# Patient Record
Sex: FEMALE | URBAN - METROPOLITAN AREA
[De-identification: names, ages, dates, MRNs, and addresses within clinical notes are randomized per-mention and may not be internally consistent; named-entity substitution may affect disease eponyms.]

---
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
## 2022-12-01 NOTE — TELEPHONE ENCOUNTER
REFERRAL INFORMATION:    Referring Provider:  Self Referral     Referring Clinic:  N/A    Reason for Visit/Diagnosis: New MWM, BMI 39       FUTURE VISIT INFORMATION:    Appointment Date: 12/7/2022    Appointment Time: 8 AM     NOTES RECORD STATUS  DETAILS   OFFICE NOTE from Referring Provider N/A    OFFICE NOTE from Other Specialists N/A    HOSPITAL DISCHARGE SUMMARY/ ED VISITS  N/A    OPERATIVE REPORT N/A    ENDOSCOPY (EGD)  N/A    PERTINENT LABS Care Everywhere    PATHOLOGY REPORTS (RELATED) N/A    IMAGING (CT, MRI, US, XR)  N/A

## 2022-12-06 ENCOUNTER — CARE COORDINATION (OUTPATIENT)
Dept: ENDOCRINOLOGY | Facility: CLINIC | Age: 22
End: 2022-12-06

## 2022-12-06 NOTE — PROGRESS NOTES
Called patient to remind her of her video visit tomorrow with Jessica Infante at 8:00 am. I reminded her to do her questionnaire as well. She acknowledged this.     Merly Jimenez, EMT

## 2022-12-07 ENCOUNTER — VIRTUAL VISIT (OUTPATIENT)
Dept: ENDOCRINOLOGY | Facility: CLINIC | Age: 22
End: 2022-12-07
Payer: COMMERCIAL

## 2022-12-07 ENCOUNTER — PRE VISIT (OUTPATIENT)
Dept: ENDOCRINOLOGY | Facility: CLINIC | Age: 22
End: 2022-12-07

## 2022-12-07 VITALS — HEIGHT: 61 IN | WEIGHT: 205 LBS | BODY MASS INDEX: 38.71 KG/M2

## 2022-12-07 DIAGNOSIS — E66.812 CLASS 2 SEVERE OBESITY WITH SERIOUS COMORBIDITY AND BODY MASS INDEX (BMI) OF 38.0 TO 38.9 IN ADULT, UNSPECIFIED OBESITY TYPE (H): ICD-10-CM

## 2022-12-07 DIAGNOSIS — E66.01 CLASS 2 SEVERE OBESITY WITH SERIOUS COMORBIDITY AND BODY MASS INDEX (BMI) OF 38.0 TO 38.9 IN ADULT, UNSPECIFIED OBESITY TYPE (H): Primary | ICD-10-CM

## 2022-12-07 DIAGNOSIS — E66.812 CLASS 2 SEVERE OBESITY WITH SERIOUS COMORBIDITY AND BODY MASS INDEX (BMI) OF 38.0 TO 38.9 IN ADULT, UNSPECIFIED OBESITY TYPE (H): Primary | ICD-10-CM

## 2022-12-07 DIAGNOSIS — E66.01 CLASS 2 SEVERE OBESITY WITH SERIOUS COMORBIDITY AND BODY MASS INDEX (BMI) OF 38.0 TO 38.9 IN ADULT, UNSPECIFIED OBESITY TYPE (H): ICD-10-CM

## 2022-12-07 DIAGNOSIS — Z71.3 NUTRITIONAL COUNSELING: Primary | ICD-10-CM

## 2022-12-07 PROBLEM — F60.89 CLUSTER B PERSONALITY DISORDER (H): Status: ACTIVE | Noted: 2020-09-24

## 2022-12-07 PROBLEM — F43.21 ADJUSTMENT DISORDER WITH DEPRESSED MOOD: Status: ACTIVE | Noted: 2017-04-13

## 2022-12-07 PROBLEM — T74.22XA CHILD SEXUAL ABUSE: Status: ACTIVE | Noted: 2019-09-11

## 2022-12-07 PROBLEM — F79 INTELLECTUAL DISABILITY: Status: ACTIVE | Noted: 2018-01-25

## 2022-12-07 PROBLEM — F33.1 MODERATE EPISODE OF RECURRENT MAJOR DEPRESSIVE DISORDER (H): Status: ACTIVE | Noted: 2019-09-09

## 2022-12-07 PROBLEM — T50.902A INTENTIONAL DRUG OVERDOSE (H): Status: ACTIVE | Noted: 2018-11-12

## 2022-12-07 PROCEDURE — 97802 MEDICAL NUTRITION INDIV IN: CPT | Mod: GT | Performed by: DIETITIAN, REGISTERED

## 2022-12-07 PROCEDURE — 99204 OFFICE O/P NEW MOD 45 MIN: CPT | Mod: GT | Performed by: NURSE PRACTITIONER

## 2022-12-07 RX ORDER — HYDROXYZINE HYDROCHLORIDE 25 MG/1
25-50 TABLET, FILM COATED ORAL
COMMUNITY
End: 2022-12-07

## 2022-12-07 RX ORDER — CITALOPRAM HYDROBROMIDE 40 MG/1
40 TABLET ORAL
COMMUNITY
End: 2022-12-07

## 2022-12-07 RX ORDER — ACETAMINOPHEN AND CODEINE PHOSPHATE 120; 12 MG/5ML; MG/5ML
0.35 SOLUTION ORAL
COMMUNITY
End: 2022-12-07

## 2022-12-07 RX ORDER — PHENOL 1.4 %
5 AEROSOL, SPRAY (ML) MUCOUS MEMBRANE
COMMUNITY
Start: 2022-07-22 | End: 2023-02-24

## 2022-12-07 RX ORDER — FERROUS SULFATE 325(65) MG
325 TABLET ORAL
COMMUNITY
End: 2022-12-07

## 2022-12-07 RX ORDER — METFORMIN HCL 500 MG
TABLET, EXTENDED RELEASE 24 HR ORAL
Qty: 60 TABLET | Refills: 3 | Status: SHIPPED | OUTPATIENT
Start: 2022-12-07 | End: 2023-02-24

## 2022-12-07 ASSESSMENT — PAIN SCALES - GENERAL: PAINLEVEL: MODERATE PAIN (5)
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
## 2022-12-07 NOTE — LETTER
"12/7/2022       RE: Keshia Keating  [address] [place] [address]     Dear Colleague,    Thank you for referring your patient, Keshia Keating, to the [facility] [place] [facility]. Please see a copy of my visit note below.    Keshia Keating is a 22 year old female who is being evaluated via a billable video visit.      The patient has been notified of following:     \"This video visit will be conducted via a call between you and your physician/provider. We have found that certain health care needs can be provided without the need for an in-person physical exam.  This service lets us provide the care you need with a video conversation.  If a prescription is necessary we can send it directly to your pharmacy.  If lab work is needed we can place an order for that and you can then stop by our lab to have the test done at a later time.    Video visits are billed at different rates depending on your insurance coverage.  Please reach out to your insurance provider with any questions.    If during the course of the call the physician/provider feels a video visit is not appropriate, you will not be charged for this service.\"    Patient has given verbal consent for Video visit? Yes  How would you like to obtain your AVS? MyChart  If you are dropped from the video visit, the video invite should be resent to: Text to cell phone: [phone]  Will anyone else be joining your video visit? No  {If patient encounters technical issues they should call [phone]      Video-Visit Details    Type of service:  Video Visit    Video Start Time: 9:41 am  Video End Time: 10:08 am    Originating Location (pt. Location): Home    Distant Location (provider location): [facility] [place]     Platform used for Video Visit: Anave[employer]    During this virtual visit the patient is located in [place], patient verifies this " "as the location during the entirety of this visit.     New Weight Management Nutrition Consultation    Keshia Keating is a 22 year old female presents today for new weight management nutrition consultation.      Patient referred by Jessica Infante on December 7, 2022.    Patient with Co-morbidities of obesity including:  Type II DM no  Renal Failure no  Sleep apnea no  Hypertension no   Dyslipidemia no  Joint pain yes hip per pt  Back pain yes  GERD no     PMH:    Irregular menses   Asthma     Depression  Hx of suicide attempt July 2022 per NP    Just recently started meeting with her therapist for first time in a couple months    Anthropometrics:  Weight 12/7/22: 205 lbs with BMI 38.73    Estimated body mass index is 38.73 kg/m  as calculated from the following:    Height as of an earlier encounter on 12/7/22: 1.549 m (5' 1\").    Weight as of an earlier encounter on 12/7/22: 93 kg (205 lb).    Medications for Weight Loss:  Metformin prescribed 12/7/22    Supplements:  Collagen  Vit E  - Tries to take but forgets     NUTRITION HISTORY    Lactose intolerance - avoids milk aside from skim    Has never worked with a RD before. Did have nutrition education previously for constipation.    Typically eats 2 meals/day and snacks.  Feels snacking is more so because of boredom.    Tries to follow plate method but doesn't always go well per pt.     Loves water and could drink all the time but forgets.   Trying to decrease juice and pop intake.   Likes to make her own juice - has a juicer but hasn't been using.    Barriers: mental health (more likely to eat if depressed), thoughts around food (use to want to vomit anytime she ate), negative self talk, eats diff than her boyfriend and she gets tempted by foods he eats    Recently broke up with boyfriend but now they are back together    Hx of purging after meals. Stopped after having kids    Additional information:   Recent sore throw has impacted intake. Harder to eat/drink. " Recommended patient follow up with doctor sooner than later.    3 kids under age 5     Nutrition Prescription  Recommended energy/nutrient modification.    Nutrition Diagnosis  Obesity r/t long history of positive energy balance aeb BMI >30.    Nutrition Intervention  Reviewed current dietary habits and pts history   Discussed long-term goals pt hopes to accomplish in RD appointments  Answered pt questions  Coordination of care   Nutrition education   AVS and handouts via [employer]     Patient demonstrates understanding.    Expected Engagement: good    Nutrition Goals  1) Moderation with pop/juice. Aim to replace with whole fruit  2) Change up type of fats when cooking - try olive oil instead    Additional resources:    The Plate Method  [email or web address]    Protein Sources   [email or web address]     Carbohydrates  [email or web address]     Mindful Eating  [email or web address]     Summary of Volumetrics Eating Plan  [email or web address]     Follow-Up:  1 month    Time spent with patient: 27 minutes.  WILLA Triplett, RD, LD            Again, thank you for allowing me to participate in the care of your patient.      Sincerely,    Abigail White RD

## 2022-12-07 NOTE — ASSESSMENT & PLAN NOTE
Adult onset obesity, after pregnancies. Has always struggled with body image but doesn't think she was actually heavy until second pregnancy. Significant family history of DMII and is concerned about developing this more recently.  No recent A1C or labs but reports signs of acanthosis migrans and frequency of urination over night. Would like to work on weight management and prevent further complications.     Mental health has been difficult recently, SI by overdose less than 6 months ago. Started with therapist last week and works with psychiatry regularly. Not currently on any medications. Mental health feels its improving. Was previously on bupropion and naltrexone but no perceived benefit to cravings/ hunger. Over dose with bupropion.     Will start with metformin, could consider GLP1 in the future (patient hesitant about injections). Will get baseline labs. Work with dietitian and continue establishing with therapist.     Recent sore throat - starting after acute URI 3 weeks ago, continues to struggle with swallowing. Has appt scheduled at primary care clinic in January for evaluation. Recommend evaluation sooner.     Plan:  -start metformin  -get labs done- will fax to [facility]   -work with dietitian   -get seen for sore throat sooner   -follow up 2 months

## 2022-12-07 NOTE — PATIENT INSTRUCTIONS
"Thank you for allowing us the privilege of caring for you. We hope we provided you with the excellent service you deserve.   Please let us know if there is anything else we can do for you so that we can be sure you are completely satisfied with your care experience.    To ensure the quality of our services you may be receiving a patient satisfaction survey from an independent patient satisfaction monitoring company.    The greatest compliment you can give is a \"Likely to Recommend\"    Your visit was with Jessica Infante NP today.    Instructions per today's visit:     Ray Keating, it was great to visit with you today.  Here is a review of our visit.  If our clinic scheduler is not able to reach you please call [phone] to schedule your next appointments.    -start metformin  -get labs done- will fax to [facility] clinic   -work with dietitian   -get seen for sore throat sooner   -follow up 2 months       Information about Video Visits with [employer]: video visit information  _________________________________________________________________________________________________________________________________________________________  If you are asked by your clinic team to have your blood pressure checked:  [facility] do offer several locations for blood pressure checks. Please follow the below link to schedule an appointment. Scheduling an appointment at the pharmacy for a blood pressure check is now preferred.    Appointment Plus (appointment-plus.Promineo studios)  _________________________________________________________________________________________________________________________________________________________  Important contact and scheduling information:  Please call our contact center at [phone] to schedule your next appointments.  To find a lab location near you, please call [phone].  For any nursing questions or concerns call Reina Macdonald LPN at [phone] or Carmelita Pérez RN at " [phone]  Please call during clinic hours Monday through Friday 8:00a - 4:00p if you have questions or you can contact us via [email or web address] at anytime and we will reply during clinic hours.    Lab results will be communicated through My Chart or letter (if My Chart not used). Please call the clinic if you have not received communication after 1 week or if you have any questions.?  Clinic Fax: [phone]    _________________________________________________________________________________________________________________________________________________________  Meal Replacement Products:    Here is the link to our new e-store where you can purchase our meal replacement products    [facility] E-Store  Ecociclus/store    The one week starter kit is a great way to sample a variety of products and see what works for you.    If you want more information about the product go to: [email or web address]    If you are an employee or [facility] or [place] please contact your care team for a 10% estore discount    Free Shipping for orders over $75     Benefits of meal replacements products:    Portion and calorie control  Improved nutrition  Structured eating  Simplified food choices  Avoid contact with trigger foods  _________________________________________________________________________________________________________________________________________________________  Interested in working with a health ?  Health coaches work with you to improve your overall health and wellbeing.  They look at the whole person, and may involve discussion of different areas of life, including, but not limited to the four pillars of health (sleep, exercise, nutrition, and stress management). Discuss with your care team if you would like to start working a health .  Health Coaching-3 Pack: Schedule by calling [phone]    $99 for three health coaching visits    Visits may  be done in person or via phone    Coaching is a partnership between the  and the client; Coaches do not prescribe or diagnose    Coaching helps inspire the client to reach his/her personal goals   _________________________________________________________________________________________________________________________________________________________  24 Week Healthy Lifestyle Plan:    Our mission in the 24-week Healthy Lifestyle Plan is to provide you with individualized care by giving you the tools, education and support you need to lose weight and maintain a healthy lifestyle. In your 24-week journey, you ll be supported by a dedicated weight loss team that includes registered dietitians, medical weight management providers, health coaches, and nurses -- all with special expertise in weight loss -- to help you every step of the way.     Monthly meetings with your registered dietician or medical weight management provider help to review your progress, update your care plan, and make any adjustments needed to ensure success. Between these visits, weekly and bi-weekly health  visits will help you focus on the four pillars of weight loss -- stress, sleep, nutrition, and exercise -- and how you can best adapt each to achieve sustainable weight loss results.    In addition, you will be given exclusive access to online wellbeing classes through [employer].  Your initial visit will be with a medical weight management provider who will help to understand your weight loss goals and ensure this program is the right fit for you. Please let our team know if you are interested in the 24 week plan by sending a message to your care team or calling [phone] to schedule.  _________________________________________________________________________________________________________________________________________________________    COMPREHENSIVE WEIGHT MANAGEMENT PROGRAM  VIRTUAL SUPPORT GROUPS    For Support Group  "Information:      We offer support groups for patients who are working on weight loss and considering, preparing for or have had weight loss surgery.   There is no cost for this opportunity.  You are invited to attend the?Virtual Support Groups?provided by any of the following locations:    [facility] via Microsoft Teams with Dorita Villareal RN  2.   [place] via [employer] with Brandon Wong, PhD, LP  3.   [place] via [employer] with Corinne Jerez RN  4.   [facility] via Macrotek Teams with Corinne Diaz [facility]    The following Support Group information can also be found on our website:  [email or web address]    [facility] Weight Loss Surgery Support Group    [facility] Weight Loss Surgery Support Group  The support group is a patient-lead forum that meets monthly to share experiences, encouragement and education. It is open to those who have had weight loss surgery, are scheduled for surgery, and those who are considering surgery.   WHEN: This group meets on the 3rd Wednesday of each month from 5:00PM - 6:00PM virtually using Microsoft Teams.   FACILITATOR: Led by Dorita Villareal RD, LD, RN, the program's Clinical Coordinator.   TO REGISTER: Please contact the clinic via Syntervention or call the nurse line directly at [phone] to inform our staff that you would like an invite sent to you and to let us know the email you would like the invite sent to. Prior to the meeting, a link with directions on how to join the meeting will be sent to you.    2022 Meetings  Billie 15: \"Let's Talk\" a time for the group to share.  July 20: \"Let's Talk\" a time for the group to share.  August 17: \"Let's Talk\" a time for the group to share.  September 21: \"Let's Talk\" a time for the group to share.  October 19: Guest Speaker: Dr Misha Rios MD Pulmonologist and Sleep Medicine Physician, \"Getting a Good Night's Sleep\".  November 16: \"Let's " "Talk\" a time for the group to share.  December 21: \"Let's Talk\" a time for the group to share.    [facility] Support Groups    Connections: Bariatric Care Support Group?  This is open to all [facility] (and those external to this program) pre- and post- operative bariatric surgery patients as well as their support system.   WHEN: This group meets the 2nd Tuesday of each month from 6:30 PM - 8:00 PM virtually using Microsoft Teams.   FACILITATOR: Led by Brandon Wong, Ph.D who is a Licensed Psychologist with the [facility] Comprehensive Weight Management Program.   TO REGISTER: Please send an email to Brandon Wong, Ph.D., LP at?[email or web address]?if you would like an invitation to the group and to learn about using Microsoft Teams.    2022 Meetings  June 14: Melissa Hanley RD, LD at [facility], \"Nutritional Labeling\"  July 12 August 2 (Please Note Date Change)  September 13 October 11 November 8 December 13    Connections: Post-Operative Bariatric Surgery Support Group  This is a support group for [facility] bariatric patients (and those external to [facility]) who have had bariatric surgery and are at least 3 months post-surgery.  WHEN: This support group meets the 4th Wednesday of the month from 11:00 AM - 12:00 PM virtually using Microsoft Teams.   FACILITATOR: Led by Certified Bariatric Nurse, Corinne Jerez RN.   TO REGISTER: Please send an email to Corinne at [email or web address] if you would like an invitation to the group and to learn about using Microsoft Teams.    2022 Meetings June 22 July 27 August 24 September 28 October 26 November 23 December 28      [facility] Healthy Lifestyle Virtual Support Group    Healthy Lifestyle Virtual Support Group?  This is 60 minutes of small group guided discussion, support and resources. All are welcome who want a healthy " "lifestyle.  WHEN: This group meets monthly on a Friday from 12:30 PM - 1:30 PM virtually using Microsoft Teams.   FACILITATOR: Led by National Board Certified Health and , Corinne Diaz [facility].   TO REGISTER: Please send an email to Corinne [email or web address] to receive monthly invites to the group or if you have any questions about having a health .  Prior to the meeting, a link with directions on how to join the meeting will be sent to you.    2022 Meetings  June 24: Corinne Diaz [facility], \"Setting Limits and Boundaries\".  Jul 29: Open Forum  August 26: Guest Speaker: Elle Corbett Registered Dietitian  September 30: Open Forum  October 28th: Guest Speaker: Stacy Johnson [facility], Health , \"Gratitude Practices\".  November 18: Guest Speaker: Abigail White RD Registered Dietitian, \"Navigating How to Eat around the Holidays\".  December 16: Guest Speaker: Shefali Mcleod [facility], \"Changing Your Relationship with Movement\".    ____________________________________________________________________________________________________________________________________________________________________________  [facility] Get Moving Program  Our team of physical therapists is trained to help you understand and take control of your condition. They will perform a thorough evaluation to determine your ability for activity and develop a customized plan to fit your goals and physical ability.  Scheduling: Unsure if the Get Moving program is right for you? Discuss the program with your medical provider or diabetes educator. You can also call us at [phone] to ask questions or schedule an appointment.   [facility] Get Moving Program  ____________________________________________________________________________________________________________________________________________________________________________  [facility] Diabetes Prevention Program (DPP)  If you have prediabetes and Medicare " please contact us via [employer] to learn more about the Diabetes Prevention Program (DPP)  Program Details:  [facility] offers the year-long Diabetes Prevention Program (DPP). The program helps you to make lifestyle changes that prevent or delay type 2 diabetes by supporting healthy eating, increased physical activity, stress reduction and use of coping skills.   On average, previous [facility] DPP cohorts have lost and maintained at least 5% of their starting weight throughout the program and averaged more than 150 minutes of physical activity per week.  Participants meet weekly for one-hour group sessions over sixteen weeks, every other week for the next 8 weeks, and monthly for the last six months.   A year-long maintenance program is also available for participants who complete the first year.   Location & Cost:   During the COVID-19 Public Health Emergency, the program is offered virtually. When in-person classes can resume, they will be held at [facility].  For people with Medicare, the program is covered in full. A self-pay option will also be available for those with non-Medicare insurance plans.   _________________________________________________________________________________________________________________________________________________________  Bluetooth Scale:    We hope to provide you with high quality virtual healthcare visits while social distancing for COVID-19 is necessary, as well as in the future when virtual visits may be more convenient for you.     Our technology team made it possible for Bluetooth scales to send weight measurements to our electronic medical record. This allows weights from you weighing at home to securely flow into the medical record, which will improve telephone and virtual visits.   Additionally, studies have shown that adults actually lose more weight when their weights are automatically sent to someone else, and also that  this process is not stressful for those adults.    Below is a link for purchasing the scale, with a discount code for our patients. You may call your insurance company to see if they will reimburse you for the cost of the scale, as a piece of durable medical equipment. The scales only go up to a weight of 400 pounds. This is an issue and we are working with the developer on increasing this. We found no scales that go over 400lb that have blue-tooth for connecting to [employer].    Scale to purchase: the Wattblock  Body  Scale: [email or web address]/[email or web address]=aw.ds    Discount Code: We have a discount code for our patients to bring the cost down to $50, Discount code is: [place]_Scale_20%off  _______________________________________________________________________________________________________________________________________________________________________________    To work with a Behavioral Health Psychologist:    Call to schedule:    Natalio Taveras - [phone]  Ulices Weir - [phone]  Yue Hamilton - [phone]  Tri Carrillo - [phone]   Milagro Navarrete PhD (cannot accept Medicare) [phone]        Thank you,   [facility] Comprehensive Weight Management Team    MEDICATION STARTED AT THIS APPOINTMENT    We are starting metformin.  Starting instructions:    Immediate release tablet: Take 1 tablet by mouth daily with a meal for 1 week, then increase to 1 tablet twice daily with meals.   Extended release tablet: Take 1 tablet by mouth daily with a meal for 1 week, then increase to 2 tablets daily with a meal or 1 tablet twice daily with meals.      Contact the nurse via [employer] or call [phone] if you have any questions or concerns    Metformin is a medication that is used to assist with lowering blood sugars in patients that have Pre-Diabetes or Diabetes. It has also been found to help with weight  loss.    Metformin helps to lower blood sugars by lowering the amount of sugar (glucose) your liver produces that would otherwise cause your blood sugar to increase. It also makes your body respond better to insulin (the product that lowers your blood sugar). It is unclear how metformin assists with weight loss.     Side-effects. Metformin is generally well tolerated. The main side-effects we see are diarrhea, nausea and stomach upset - this tends to subside over time as your body gets used to the medication. Taking this medications with food will lower these side effects.    Low blood sugar (hypoglycemia) is rare to occur with metformin, but can occur if you do not eat enough or are taking other medications that assist to lower blood sugar. The signs of low blood sugar are:  Weakness  Shaky   Hungry  Sweating  Confusion    See below for ways to treat low blood sugar without adding in lots of extra calories.    Treating Low Blood Sugar    If you have symptoms of low blood sugar (sweating, shaking, dizzy, confused) eat 15 grams of carbs and wait 15 minutes:    Glucose Tabs are best for sugars under 70 -  Dex4 or BD Glucose tablets are good, you will need to take 3-4 of these to equal 15 grams.     One small box of raisins  4 oz fruit juice box or   cup fruit juice  1 small apple  1 small banana    cup canned fruit in water    English muffin or a slice of bread with jelly   1 low fat frozen waffle with sugar-free syrup    cup cottage cheese with   cup frozen or fresh blueberries  1 cup skim or low-fat milk    cup whole grain cereal  4-6 crackers such as Triscuits    Please refer to the pharmacy insert for more information on side-effects. Please call the clinic should you have more questions regarding this medication.      In order to get refills of this or any medication we prescribe you must be seen in the medical weight mgmt clinic every 2-3 months.

## 2022-12-07 NOTE — PROGRESS NOTES
"Keshia Keating is a 22 year old female who is being evaluated via a billable video visit.      The patient has been notified of following:     \"This video visit will be conducted via a call between you and your physician/provider. We have found that certain health care needs can be provided without the need for an in-person physical exam.  This service lets us provide the care you need with a video conversation.  If a prescription is necessary we can send it directly to your pharmacy.  If lab work is needed we can place an order for that and you can then stop by our lab to have the test done at a later time.    Video visits are billed at different rates depending on your insurance coverage.  Please reach out to your insurance provider with any questions.    If during the course of the call the physician/provider feels a video visit is not appropriate, you will not be charged for this service.\"    Patient has given verbal consent for Video visit? Yes  How would you like to obtain your AVS? MyChart  If you are dropped from the video visit, the video invite should be resent to: Text to cell phone: [phone]  Will anyone else be joining your video visit? No  {If patient encounters technical issues they should call [phone]      Video-Visit Details    Type of service:  Video Visit    Video Start Time: 9:41 am  Video End Time: 10:08 am    Originating Location (pt. Location): Home    Distant Location (provider location): [facility]NAG[facility] [place]     Platform used for Video Visit: XDx    During this virtual visit the patient is located in [place], patient verifies this as the location during the entirety of this visit.     New Weight Management Nutrition Consultation    Keshia Keating is a 22 year old female presents today for new weight management nutrition consultation.      Patient referred by Jessica Infante on December 7, 2022.    Patient with Co-morbidities of obesity including:  Type " "II DM no  Renal Failure no  Sleep apnea no  Hypertension no   Dyslipidemia no  Joint pain yes hip per pt  Back pain yes  GERD no     PMH:    Irregular menses   Asthma     Depression  Hx of suicide attempt July 2022 per NP    Just recently started meeting with her therapist for first time in a couple months    Anthropometrics:  Weight 12/7/22: 205 lbs with BMI 38.73    Estimated body mass index is 38.73 kg/m  as calculated from the following:    Height as of an earlier encounter on 12/7/22: 1.549 m (5' 1\").    Weight as of an earlier encounter on 12/7/22: 93 kg (205 lb).    Medications for Weight Loss:  Metformin prescribed 12/7/22    Supplements:  Collagen  Vit E  - Tries to take but forgets     NUTRITION HISTORY    Lactose intolerance - avoids milk aside from skim    Has never worked with a RD before. Did have nutrition education previously for constipation.    Typically eats 2 meals/day and snacks.  Feels snacking is more so because of boredom.    Tries to follow plate method but doesn't always go well per pt.     Loves water and could drink all the time but forgets.   Trying to decrease juice and pop intake.   Likes to make her own juice - has a juicer but hasn't been using.    Barriers: mental health (more likely to eat if depressed), thoughts around food (use to want to vomit anytime she ate), negative self talk, eats diff than her boyfriend and she gets tempted by foods he eats    Recently broke up with boyfriend but now they are back together    Hx of purging after meals. Stopped after having kids    Additional information:   Recent sore throw has impacted intake. Harder to eat/drink. Recommended patient follow up with doctor sooner than later.    3 kids under age 5     Nutrition Prescription  Recommended energy/nutrient modification.    Nutrition Diagnosis  Obesity r/t long history of positive energy balance aeb BMI >30.    Nutrition Intervention  Reviewed current dietary habits and pts history   Discussed " long-term goals pt hopes to accomplish in RD appointments  Answered pt questions  Coordination of care   Nutrition education   AVS and handouts via Fangxinmeihart     Patient demonstrates understanding.    Expected Engagement: good    Nutrition Goals  1) Moderation with pop/juice. Aim to replace with whole fruit  2) Change up type of fats when cooking - try olive oil instead    Additional resources:    The Plate Method  [email or web address]    Protein Sources   [email or web address]     Carbohydrates  [email or web address]     Mindful Eating  [email or web address]     Summary of Volumetrics Eating Plan  [email or web address]     Follow-Up:  1 month    Time spent with patient: 27 minutes.  WILLA Triplett, RD, LD

## 2022-12-07 NOTE — LETTER
Date:December 8, 2022      Provider requested that no letter be sent. Do not send.       [facility]

## 2022-12-07 NOTE — PROGRESS NOTES
Keshia is a 22 year old who is being evaluated via a billable video visit.      How would you like to obtain your AVS? MyChart  If the video visit is dropped, the invitation should be resent by: Text to cell phone:   Will anyone else be joining your video visit? No    During this virtual visit the patient is located in [place], patient verifies this as the location during the entirety of this visit.       Video-Visit Details    Video Start Time: 0813    Type of service:  Video Visit    Video End Time:0853    Originating Location (pt. Location): Home        Distant Location (provider location):  Off-site    Platform used for Video Visit: AmyWell
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
## 2022-12-07 NOTE — PATIENT INSTRUCTIONS
Nutrition Goals  1) Moderation with pop/juice. Aim to replace with whole fruit  2) Change up type of fats when cooking - try olive oil instead    Additional resources:    The Plate Method  [email or web address]    Protein Sources   [email or web address]     Carbohydrates  [email or web address]     Mindful Eating  [email or web address]     Summary of Volumetrics Eating Plan  [email or web address]     Follow-Up:  1 month    WILLA Cantor, RD, LD  Clinic #: [phone]

## 2022-12-07 NOTE — LETTER
Date:December 8, 2022      Provider requested that no letter be sent. Do not send.       [facility]

## 2023-02-05 ENCOUNTER — HEALTH MAINTENANCE LETTER (OUTPATIENT)
Age: 23
End: 2023-02-05

## 2023-02-24 ENCOUNTER — TELEPHONE (OUTPATIENT)
Dept: ENDOCRINOLOGY | Facility: CLINIC | Age: 23
End: 2023-02-24

## 2023-02-24 ENCOUNTER — VIRTUAL VISIT (OUTPATIENT)
Dept: ENDOCRINOLOGY | Facility: CLINIC | Age: 23
End: 2023-02-24
Payer: COMMERCIAL

## 2023-02-24 VITALS — WEIGHT: 206.2 LBS | HEIGHT: 61 IN | BODY MASS INDEX: 38.93 KG/M2

## 2023-02-24 DIAGNOSIS — E66.01 CLASS 2 SEVERE OBESITY WITH SERIOUS COMORBIDITY AND BODY MASS INDEX (BMI) OF 38.0 TO 38.9 IN ADULT, UNSPECIFIED OBESITY TYPE (H): Primary | ICD-10-CM

## 2023-02-24 DIAGNOSIS — E66.812 CLASS 2 SEVERE OBESITY WITH SERIOUS COMORBIDITY AND BODY MASS INDEX (BMI) OF 38.0 TO 38.9 IN ADULT, UNSPECIFIED OBESITY TYPE (H): Primary | ICD-10-CM

## 2023-02-24 PROCEDURE — 99213 OFFICE O/P EST LOW 20 MIN: CPT | Mod: VID | Performed by: NURSE PRACTITIONER

## 2023-02-24 RX ORDER — SEMAGLUTIDE 0.5 MG/.5ML
0.5 INJECTION, SOLUTION SUBCUTANEOUS
Qty: 2 ML | Refills: 1 | Status: SHIPPED | OUTPATIENT
Start: 2023-02-24

## 2023-02-24 RX ORDER — SEMAGLUTIDE 0.25 MG/.5ML
0.25 INJECTION, SOLUTION SUBCUTANEOUS
Qty: 2 ML | Refills: 0 | Status: SHIPPED | OUTPATIENT
Start: 2023-02-24

## 2023-02-24 ASSESSMENT — PAIN SCALES - GENERAL: PAINLEVEL: NO PAIN (0)

## 2023-02-24 NOTE — LETTER
March 3, 2023    To: Aparna      RE: Keshia Keating  [address] [place] [address]  : 2000  MRN: [id number]  Policy #:  Case/Reference:   Member ID: [id number]     Payor: [employer] Ph: [phone]     Benefit plan: [id number] [facility] Ph: [phone]     Group number: [id number]     Member effective dates: from 23          To Whom It May Concern,    I am writing on behalf of my patient, Keshia Keating to document the medical necessity of Wegovy for the treatment of obesity. This letter provides information about the patient's medical history and diagnosis and a statement summarizing my treatment rationale.     Summary of Patient History and Diagnosis  Class 2 severe obesity with serious comorbidity and body mass index (BMI) of 38.0 to 38.9 in adult, unspecified obesity type (H) - Primary    Current weight 206 lbs 3.2 oz     Initial Weight (lbs): 205 lbs  Cumulative weight loss (lbs): -1.2  Weight Loss Percentage: -0.59%    Depression, Back Pain, Fatigue, Hip Pain, Irregular Menstral Cycle, asthma      Treatment Rationale  Side effects to metformin, has stopped this. Difficulty swallowing with tonsil issues recently. Being worked up for possible tonsillectomy. Eating mostly soft/ liquids right now. Increased hunger since this change.     Start Wegovy  Phentermine is contraindicated in this patient. She has hx of anxiety that has been poorly controlled recently          Duration  Up to 12 months      Summary  In summary, Wegovy is medically necessary for this patient s medical condition. Please call my office at [phone] if I can provide you with any additional information to approve my request. I look forward to receiving your timely response and approval of this request.     Sincerely,    Jessica Infante, CNP

## 2023-02-24 NOTE — TELEPHONE ENCOUNTER
Prior authorization is needed. Please start PA.        Thank you,     Anselmo Klein CPhT  Pharmacy Clinic [facility]  [email or web address]   Phone: [phone]  Fax: [phone]

## 2023-02-24 NOTE — ASSESSMENT & PLAN NOTE
Side effects to metformin, has stopped this. Difficulty swallowing with tonsil issues recently. Being worked up for possible tonsillectomy. Eating mostly soft/ liquids right now. Increased hunger since this change.     Recommend follow up with RD to optimize nutrition with current eating restrictions.     Discussed GLP1 to help with weight loss.     Plan:  Start wegovy  Consider protein shakes if going to miss a meal  Follow up with dietitian   Follow up with me in 3 months   Labs when in clinic in March

## 2023-02-24 NOTE — LETTER
2023       RE: Keshia Keating  [address] [place] [address]     Dear Colleague,    Thank you for referring your patient, Keshia Keating, to the [facility]. Please see a copy of my visit note below.    Keshia is a 22 year old who is being evaluated via a billable video visit.      How would you like to obtain your AVS? MyChart  If the video visit is dropped, the invitation should be resent by: Text to cell phone: [phone]  Will anyone else be joining your video visit? No        Video-Visit Details    Type of service:  Video Visit   Video Start Time:  Video End Time:154    Originating Location (pt. Location): Home    Distant Location (provider location):  Off-site  Platform used for Video Visit: [facility]    Patient will be in [place] for the whole visit.    Gifty Montiel MA       Return Medical Weight Management Note     Keshia Keating  MRN:  [id number]  :  2000  ELE:  2023    Dear No primary care provider on file.,    I had the pleasure of seeing your patient Keshia Keating. She is a 22 year old female who I am continuing to see for treatment of obesity related to:       2022   I have the following health issues associated with obesity: Asthma   I have the following symptoms associated with obesity: Depression, Back Pain, Fatigue, Hip Pain, Irregular Menstral Cycle       Assessment & Plan   Problem List Items Addressed This Visit        Digestive    Class 2 severe obesity with serious comorbidity and body mass index (BMI) of 38.0 to 38.9 in adult, unspecified obesity type (H) - Primary     Side effects to metformin, has stopped this. Difficulty swallowing with tonsil issues recently. Being worked up for possible tonsillectomy. Eating mostly soft/ liquids right now. Increased hunger since this change.     Recommend follow up with RD to optimize nutrition with  current eating restrictions.     Discussed GLP1 to help with weight loss.     Plan:  Start wegovy  Consider protein shakes if going to miss a meal  Follow up with dietitian   Follow up with me in 3 months   Labs when in clinic in March          Relevant Medications    Semaglutide-Weight Management (WEGOVY) 0.25 MG/0.5ML pen    Semaglutide-Weight Management (WEGOVY) 0.5 MG/0.5ML pen          INTERVAL HISTORY:  New MWM 12/7/2022 - significant mental health work being done at initial consult. Fear of family hx of DMII.     May need tonsils removed     Anti-obesity medications:     Current:   Metformin - took for a time - couldn't continue to take the pills because of sore throat- increased gas and upset stomach        Recent diet changes:   Struggles with chicken and pork   Would like to consider cutting out meats over all   Trying to get adequate fluids- 3-4 16oz bottles daily  Increased cravings with not being able to eat much recently      Recent stressors: throat issues     Recent sleep changes: increased difficulty with sleep since July     Vitamins/Labs: ordered but not completed yet     CURRENT WEIGHT:   206 lbs 3.2 oz    Initial Weight (lbs): 205 lbs     Cumulative weight loss (lbs): -1.2  Weight Loss Percentage: -0.59%    Changes and Difficulties 2/20/2023   I have made the following changes to my diet since my last visit: Added more fruits and less pop   With regards to my diet, I am still struggling with: Eating when im bored   I have made the following changes to my activity/exercise since my last visit: I am walking out into the community a bit more   With regards to my activity/exercise, I am still struggling with: Setting a schedule to go on walks         MEDICATIONS:   Current Outpatient Medications   Medication Sig Dispense Refill     Semaglutide-Weight Management (WEGOVY) 0.25 MG/0.5ML pen Inject 0.25 mg Subcutaneous every 7 days 2 mL 0     Semaglutide-Weight Management (WEGOVY) 0.5 MG/0.5ML pen  "Inject 0.5 mg Subcutaneous every 7 days 2 mL 1       Weight Loss Medication History Reviewed With Patient 2/20/2023   Which weight loss medications are you currently taking on a regular basis?  None   If you are not taking a weight loss medication that was prescribed to you, please indicate why: I did not like the side effects, Other   Are you having any side effects from the weight loss medication that we have prescribed you? No           PHYSICAL EXAM:  Objective     Ht 1.549 m (5' 1\")   Wt 93.5 kg (206 lb 3.2 oz)   BMI 38.96 kg/m      Vitals - Patient Reported  Pain Score: No Pain (0)        GENERAL: Healthy, alert and no distress  EYES: Eyes grossly normal to inspection.  No discharge or erythema, or obvious scleral/conjunctival abnormalities.  RESP: No audible wheeze, cough, or visible cyanosis.  No visible retractions or increased work of breathing.    SKIN: Visible skin clear. No significant rash, abnormal pigmentation or lesions.  NEURO: Cranial nerves grossly intact.  Mentation and speech appropriate for age.  PSYCH: Mentation appears normal, affect normal/bright, judgement and insight intact, normal speech and appearance well-groomed.        Sincerely,    Jessica Infante NP      25 minutes spent on the date of the encounter doing chart review, history and exam, documentation and further activities per the note      Again, thank you for allowing me to participate in the care of your patient.      Sincerely,    Jessica Infante NP      "

## 2023-02-24 NOTE — PATIENT INSTRUCTIONS
"Thank you for allowing us the privilege of caring for you. We hope we provided you with the excellent service you deserve.   Please let us know if there is anything else we can do for you so that we can be sure you are completely satisfied with your care experience.    To ensure the quality of our services you may be receiving a patient satisfaction survey from an independent patient satisfaction monitoring company.    The greatest compliment you can give is a \"Likely to Recommend\"    Your visit was with Jessica Infante NP today.    Instructions per today's visit:     Ray Keating, it was great to visit with you today.  Here is a review of our visit.  If our clinic scheduler is not able to reach you please call [phone] to schedule your next appointments.    Plan:  Start wegovy  Consider protein shakes if going to miss a meal  Follow up with dietitian   Follow up with me in 3 months   Labs when in clinic in March      Information about Video Visits with [facility]: video visit information  _________________________________________________________________________________________________________________________________________________________  If you are asked by your clinic team to have your blood pressure checked:  [facility] do offer several locations for blood pressure checks. Please follow the below link to schedule an appointment. Scheduling an appointment at the pharmacy for a blood pressure check is now preferred.    Appointment Plus (appointment-plus.Action Engine)  _________________________________________________________________________________________________________________________________________________________  Important contact and scheduling information:  Please call our contact center at [phone] to schedule your next appointments.  To find a lab location near you, please call [phone].  For any nursing questions or concerns call Reina Macdonald LPN at [phone] or Carmelita Pérez " RN at [phone]  Please call during clinic hours Monday through Friday 8:00a - 4:00p if you have questions or you can contact us via Ricot at anytime and we will reply during clinic hours.    Lab results will be communicated through My Chart or letter (if My Chart not used). Please call the clinic if you have not received communication after 1 week or if you have any questions.?  Clinic Fax: [phone]    _________________________________________________________________________________________________________________________________________________________  Meal Replacement Products:    Here is the link to our new e-store where you can purchase our meal replacement products    [email or web address] E-Store  Yurbuds/store    The one week starter kit is a great way to sample a variety of products and see what works for you.    If you want more information about the product go to: [email or web address]    If you are an employee or [facility] or  [employer] please contact your care team for a 10% estore discount    Free Shipping for orders over $75     Benefits of meal replacements products:    Portion and calorie control  Improved nutrition  Structured eating  Simplified food choices  Avoid contact with trigger foods  _________________________________________________________________________________________________________________________________________________________  Interested in working with a health ?  Health coaches work with you to improve your overall health and wellbeing.  They look at the whole person, and may involve discussion of different areas of life, including, but not limited to the four pillars of health (sleep, exercise, nutrition, and stress management). Discuss with your care team if you would like to start working a health .  Health Coaching-3 Pack: Schedule by calling [phone]    $99 for three health coaching visits    Visits  may be done in person or via phone    Coaching is a partnership between the  and the client; Coaches do not prescribe or diagnose    Coaching helps inspire the client to reach his/her personal goals   _________________________________________________________________________________________________________________________________________________________  24 Week Healthy Lifestyle Plan:    Our mission in the 24-week Healthy Lifestyle Plan is to provide you with individualized care by giving you the tools, education and support you need to lose weight and maintain a healthy lifestyle. In your 24-week journey, you ll be supported by a dedicated weight loss team that includes registered dietitians, medical weight management providers, health coaches, and nurses -- all with special expertise in weight loss -- to help you every step of the way.     Monthly meetings with your registered dietician or medical weight management provider help to review your progress, update your care plan, and make any adjustments needed to ensure success. Between these visits, weekly and bi-weekly health  visits will help you focus on the four pillars of weight loss -- stress, sleep, nutrition, and exercise -- and how you can best adapt each to achieve sustainable weight loss results.    In addition, you will be given exclusive access to online wellbeing classes through [employer].  Your initial visit will be with a medical weight management provider who will help to understand your weight loss goals and ensure this program is the right fit for you. Please let our team know if you are interested in the 24 week plan by sending a message to your care team or calling [phone] to schedule.  _________________________________________________________________________________________________________________________________________________________  __________  [facility] Get Moving Program  Our team of physical  therapists is trained to help you understand and take control of your condition. They will perform a thorough evaluation to determine your ability for activity and develop a customized plan to fit your goals and physical ability.  Scheduling: Unsure if the Get Moving program is right for you? Discuss the program with your medical provider or diabetes educator. You can also call us at [phone] to ask questions or schedule an appointment.   [facility] Get Moving Program  ____________________________________________________________________________________________________________________________________________________________________________  [facility] Diabetes Prevention Program (DPP)  If you have prediabetes and Medicare please contact us via [employer] to learn more about the Diabetes Prevention Program (DPP)  Program Details:  [facility] offers the year-long Diabetes Prevention Program (DPP). The program helps you to make lifestyle changes that prevent or delay type 2 diabetes by supporting healthy eating, increased physical activity, stress reduction and use of coping skills.   On average, previous [facility] DPP cohorts have lost and maintained at least 5% of their starting weight throughout the program and averaged more than 150 minutes of physical activity per week.  Participants meet weekly for one-hour group sessions over sixteen weeks, every other week for the next 8 weeks, and monthly for the last six months.   A year-long maintenance program is also available for participants who complete the first year.   Location & Cost:   During the COVID-19 Public Health Emergency, the program is offered virtually. When in-person classes can resume, they will be held at [facility].  For people with Medicare, the program is covered in full. A self-pay option will also be available for those with non-Medicare insurance plans.    ______________________________________________________________________________________________________________________________________________________________________________________________________________________________    To work with a Behavioral Health Psychologist:    Call to schedule:    Natalio Taveras - [phone]  Ulices Weir - [phone]  Yue Hamilton - [phone]  Tri Carrillo - [phone]   Milagro Navarrete PhD (cannot accept Medicare) [phone]        Thank [facility] Comprehensive Weight Management Team

## 2023-02-24 NOTE — PROGRESS NOTES
Keshia is a 22 year old who is being evaluated via a billable video visit.      How would you like to obtain your AVS? MyChart  If the video visit is dropped, the invitation should be resent by: Text to cell phone: [phone]  Will anyone else be joining your video visit? No        Video-Visit Details    Type of service:  Video Visit   Video Start Time:1530  Video End Time:1547    Originating Location (pt. Location): Home    Distant Location (provider location):  Off-site  Platform used for Video Visit: Marguerite    Patient will be in [place] for the whole visit.    Gifty Montiel MA

## 2023-02-24 NOTE — PROGRESS NOTES
Return Medical Weight Management Note     Keshia Keating  MRN:  [id number]  :  2000  ELE:  2023    Dear No primary care provider on file.,    I had the pleasure of seeing your patient Keshia Keating. She is a 22 year old female who I am continuing to see for treatment of obesity related to:       2022   I have the following health issues associated with obesity: Asthma   I have the following symptoms associated with obesity: Depression, Back Pain, Fatigue, Hip Pain, Irregular Menstral Cycle       Assessment & Plan   Problem List Items Addressed This Visit        Digestive    Class 2 severe obesity with serious comorbidity and body mass index (BMI) of 38.0 to 38.9 in adult, unspecified obesity type (H) - Primary     Side effects to metformin, has stopped this. Difficulty swallowing with tonsil issues recently. Being worked up for possible tonsillectomy. Eating mostly soft/ liquids right now. Increased hunger since this change.     Recommend follow up with RD to optimize nutrition with current eating restrictions.     Discussed GLP1 to help with weight loss.     Plan:  Start wegovy  Consider protein shakes if going to miss a meal  Follow up with dietitian   Follow up with me in 3 months   Labs when in clinic in March          Relevant Medications    Semaglutide-Weight Management (WEGOVY) 0.25 MG/0.5ML pen    Semaglutide-Weight Management (WEGOVY) 0.5 MG/0.5ML pen          INTERVAL HISTORY:  New MWM 2022 - significant mental health work being done at initial consult. Fear of family hx of DMII.     May need tonsils removed     Anti-obesity medications:     Current:   Metformin - took for a time - couldn't continue to take the pills because of sore throat- increased gas and upset stomach        Recent diet changes:   Struggles with chicken and pork   Would like to consider cutting out meats over all   Trying to get adequate fluids- 3-4 16oz bottles daily  Increased cravings with not being  "able to eat much recently      Recent stressors: throat issues     Recent sleep changes: increased difficulty with sleep since July     Vitamins/Labs: ordered but not completed yet     CURRENT WEIGHT:   206 lbs 3.2 oz    Initial Weight (lbs): 205 lbs     Cumulative weight loss (lbs): -1.2  Weight Loss Percentage: -0.59%    Changes and Difficulties 2/20/2023   I have made the following changes to my diet since my last visit: Added more fruits and less pop   With regards to my diet, I am still struggling with: Eating when im bored   I have made the following changes to my activity/exercise since my last visit: I am walking out into the community a bit more   With regards to my activity/exercise, I am still struggling with: Setting a schedule to go on walks         MEDICATIONS:   Current Outpatient Medications   Medication Sig Dispense Refill     Semaglutide-Weight Management (WEGOVY) 0.25 MG/0.5ML pen Inject 0.25 mg Subcutaneous every 7 days 2 mL 0     Semaglutide-Weight Management (WEGOVY) 0.5 MG/0.5ML pen Inject 0.5 mg Subcutaneous every 7 days 2 mL 1       Weight Loss Medication History Reviewed With Patient 2/20/2023   Which weight loss medications are you currently taking on a regular basis?  None   If you are not taking a weight loss medication that was prescribed to you, please indicate why: I did not like the side effects, Other   Are you having any side effects from the weight loss medication that we have prescribed you? No           PHYSICAL EXAM:  Objective    Ht 1.549 m (5' 1\")   Wt 93.5 kg (206 lb 3.2 oz)   BMI 38.96 kg/m      Vitals - Patient Reported  Pain Score: No Pain (0)        GENERAL: Healthy, alert and no distress  EYES: Eyes grossly normal to inspection.  No discharge or erythema, or obvious scleral/conjunctival abnormalities.  RESP: No audible wheeze, cough, or visible cyanosis.  No visible retractions or increased work of breathing.    SKIN: Visible skin clear. No significant rash, abnormal " pigmentation or lesions.  NEURO: Cranial nerves grossly intact.  Mentation and speech appropriate for age.  PSYCH: Mentation appears normal, affect normal/bright, judgement and insight intact, normal speech and appearance well-groomed.        Sincerely,    Jessica Infante NP      25 minutes spent on the date of the encounter doing chart review, history and exam, documentation and further activities per the note

## 2023-02-24 NOTE — LETTER
Date:February 27, 2023      Provider requested that no letter be sent. Do not send.       [facility]

## 2023-03-01 NOTE — TELEPHONE ENCOUNTER
PA Initiation    Medication: Semaglutide-Weight Management (WEGOVY) 0.25 MG/0.5ML pen   Insurance Company: [employer] - Phone [phone] Fax [phone]  [facility] the [id number] [address]  Filling Pharmacy Phone: [phone]  Filling Pharmacy Fax: [phone]  Start Date: 2/28/2023

## 2023-03-02 NOTE — TELEPHONE ENCOUNTER
PRIOR AUTHORIZATION DENIED    Medication: Semaglutide-Weight Management (WEGOVY) 0.25 MG/0.5ML pen--DENIED    Denial Date: 3/1/2023    Denial Rational: Patient needs to try and fail phentermine.     Appeal Information:

## 2023-03-02 NOTE — TELEPHONE ENCOUNTER
Prior authorization denied. Please see denial rational below. Will provider be appealing?    Thank you,     Anselmo Klein CPhT  Pharmacy Clinic [facility]  [email or web address]   Phone: [phone]  Fax: [phone]

## 2023-03-03 NOTE — TELEPHONE ENCOUNTER
Medication Appeal Request    Please initiate an appeal for the requested medication: Semaglutide-Weight Management (WEGOVY) 0.25 MG/0.5ML pen--DENIED    Has a letter of medical necessity been completed in EPIC?   yes    Any additional lab values/information to include?     Would you like to include any research articles?               If yes please include the hyperlink(s) below or fax to    [phone] for Specialty/Retail               [phone] for Infusion/Clinic Administered.                Include the patients name and MRN on the fax cover sheet.

## 2023-03-03 NOTE — TELEPHONE ENCOUNTER
Medication Appeal Initiation    We have initiated an appeal for the requested medication:  Medication: Semaglutide-Weight Management (WEGOVY) 0.25 MG/0.5ML pen--PA Appeal Pending  Appeal Start Date:  3/3/2023  Insurance Company: [employer] - Phone [phone] Fax [phone]  Comments:   Appeal letter and chart notes faxed to [employer] appeals at [phone]. Fax confirmed sent.      Thank you,     Anselmo Klein [facility]  Pharmacy Clinic [facility]  [email or web address]   Phone: [phone]  Fax: [phone]

## 2023-03-06 ENCOUNTER — VIRTUAL VISIT (OUTPATIENT)
Dept: ENDOCRINOLOGY | Facility: CLINIC | Age: 23
End: 2023-03-06
Payer: COMMERCIAL

## 2023-03-06 DIAGNOSIS — Z71.3 NUTRITIONAL COUNSELING: Primary | ICD-10-CM

## 2023-03-06 DIAGNOSIS — E66.812 CLASS 2 SEVERE OBESITY WITH SERIOUS COMORBIDITY AND BODY MASS INDEX (BMI) OF 38.0 TO 38.9 IN ADULT, UNSPECIFIED OBESITY TYPE (H): ICD-10-CM

## 2023-03-06 DIAGNOSIS — E66.01 CLASS 2 SEVERE OBESITY WITH SERIOUS COMORBIDITY AND BODY MASS INDEX (BMI) OF 38.0 TO 38.9 IN ADULT, UNSPECIFIED OBESITY TYPE (H): ICD-10-CM

## 2023-03-06 PROCEDURE — 97803 MED NUTRITION INDIV SUBSEQ: CPT | Mod: VID | Performed by: DIETITIAN, REGISTERED

## 2023-03-06 NOTE — LETTER
"3/6/2023       RE: Keshia Keating  [address] [place] [address]     Dear Colleague,    Thank you for referring your patient, Keshia Keating, to the [facility] [place] [facility]. Please see a copy of my visit note below.    Keshia Keating is a 22 year old female who is being evaluated via a billable video visit.      The patient has been notified of following:     \"This video visit will be conducted via a call between you and your physician/provider. We have found that certain health care needs can be provided without the need for an in-person physical exam.  This service lets us provide the care you need with a video conversation.  If a prescription is necessary we can send it directly to your pharmacy.  If lab work is needed we can place an order for that and you can then stop by our lab to have the test done at a later time.    Video visits are billed at different rates depending on your insurance coverage.  Please reach out to your insurance provider with any questions.    If during the course of the call the physician/provider feels a video visit is not appropriate, you will not be charged for this service.\"    Patient has given verbal consent for Video visit? Yes  How would you like to obtain your AVS? MyChart  If you are dropped from the video visit, the video invite should be resent to: Text to cell phone: [phone]  Will anyone else be joining your video visit? No  {If patient encounters technical issues they should call [phone]      Video-Visit Details    Type of service:  Video Visit    Video Start Time: 1:05 pm  Video End Time: 1:23 pm    Originating Location (pt. Location): Home    Distant Location (provider location): Offsite    Platform used for Video Visit: [employer]or[employer]pay    During this virtual visit the patient is located in [place], patient verifies this as the location during the entirety of this " "visit.     Weight Management Nutrition Consultation    Keshia Keating is a 22 year old female presents today for weight management nutrition consultation.      Patient referred by Jessica Infante on December 7, 2022.    Patient with Co-morbidities of obesity including:  Type II DM no  Renal Failure no  Sleep apnea no  Hypertension no   Dyslipidemia no  Joint pain yes hip per pt  Back pain yes  GERD no     PMH:    Irregular menses   Asthma     Depression  Hx of suicide attempt July 2022 per NP    Just recently started meeting with her therapist for first time in a couple months    Anthropometrics:  Weight 12/7/22: 205 lbs with BMI 38.73    Estimated body mass index is 38.96 kg/m  as calculated from the following:    Height as of 2/24/23: 1.549 m (5' 1\").    Weight as of 2/24/23: 93.5 kg (206 lb 3.2 oz).     Current weight: 206 lbs 2/24/23    Medications for Weight Loss:  Metformin prescribed 12/7/22 - Stopped, was having increased gas and upset stomach. Lots of burping per pt    Wegovy prescribed 2/24/23 - still waiting on     Supplements:  Collagen  Vit E  - Tries to take but forgets often    NUTRITION HISTORY    Lactose intolerance - avoids milk aside from skim    Has never worked with a RD before. Did have nutrition education previously for constipation.    Typically eats 2 meals/day and snacks.  Feels snacking is more so because of boredom.    Tries to follow plate method but doesn't always go well per pt.     Loves water and could drink all the time but forgets.   Trying to decrease juice and pop intake.   Likes to make her own juice - has a juicer but hasn't been using.    Barriers: mental health (more likely to eat if depressed), thoughts around food (use to want to vomit anytime she ate), negative self talk, eats diff than her boyfriend and she gets tempted by foods he eats    Recently broke up with boyfriend but now they are back together    Hx of purging after meals. Stopped after having kids    March " 2023:    Throat issues continue. Having a consult with ENT March 23rd. Also has sleep med appt coming up - has noticed increased issues with breathing due to her tonsils touching per pt. Likely going to have them removed     No motivation to cook - throat issues and also has been sick (cough/fever/chills)     Plans to look into protein shakes but has not yet.     Staying well hydrated per pt - lots of water, some juice.    Previous goals:  1) Moderation with pop/juice. Aim to replace with whole fruit -  Going okay per pt. Not much motivation right now due to being sick per pt.   2) Change up type of fats when cooking - try olive oil instead    Additional information:   Recent sore throw has impacted intake. Harder to eat/drink. Recommended patient follow up with doctor sooner than later.    3 kids under age 5     Nutrition Prescription  Recommended energy/nutrient modification.    Nutrition Diagnosis  Obesity r/t long history of positive energy balance aeb BMI >30.    Nutrition Intervention  Reviewed and modified prevoius goals  Answered pt questions  Coordination of care   Nutrition education   AVS and handouts via [employer]     Patient demonstrates understanding.    Expected Engagement: good    Nutrition Goals  1. Consider flavored water instead of juice (crystal light, fruit, vegetables - cucumber water, demarcus/limes)     Fast, softer protein options:  - Greek yogurt  - Cottage cheese  - Deli meat  - Turkey/beef stick (chew well)  - Hard boiled eggs   - Tuna   - Quinoa   - Beans  - Lentils    Pureed Foods  [email or web address]     Pureed Recipes  [email or web address]    Soft Foods  [email or web address]    Additional resources:    The Plate Method  [email or web address]    Protein Sources   [email or web address]     Carbohydrates  [email or web address]     Mindful Eating  [email or web address]     Summary of Volumetrics Eating  Plan  http://"[employer]"/[email or web address]     Follow-Up:  Thursday, April 6th at 11:30 am    Time spent with patient: 18 minutes.  WILLA Triplett, RD, LD          Again, thank you for allowing me to participate in the care of your patient.      Sincerely,    Abigail White RD

## 2023-03-06 NOTE — PATIENT INSTRUCTIONS
Nutrition Goals  1. Consider flavored water instead of juice (crystal light, fruit, vegetables - cucumber water, demarcus/limes)     Fast, softer protein options:  - Greek yogurt  - Cottage cheese  - Deli meat  - Turkey/beef stick (chew well)  - Hard boiled eggs   - Tuna   - Quinoa   - Beans  - Lentils    Pureed Foods  [email or web address]     Pureed Recipes  [email or web address]://[email or web address]    Soft Foods  [email or web address]    Additional resources:    The Plate Method  [email or web address]://[email or web address]    Protein Sources   http://[employer]/[email or web address]     Carbohydrates  [email or web address]     Mindful Eating  http://[email or web address]     Summary of Volumetrics Eating Plan  [email or web address]     Follow-Up:  Thursday, April 6th at 11:30 am    WILLA Cantor, RD, LD  Clinic #: [phone]

## 2023-03-06 NOTE — PROGRESS NOTES
"Keshia Keating is a 22 year old female who is being evaluated via a billable video visit.      The patient has been notified of following:     \"This video visit will be conducted via a call between you and your physician/provider. We have found that certain health care needs can be provided without the need for an in-person physical exam.  This service lets us provide the care you need with a video conversation.  If a prescription is necessary we can send it directly to your pharmacy.  If lab work is needed we can place an order for that and you can then stop by our lab to have the test done at a later time.    Video visits are billed at different rates depending on your insurance coverage.  Please reach out to your insurance provider with any questions.    If during the course of the call the physician/provider feels a video visit is not appropriate, you will not be charged for this service.\"    Patient has given verbal consent for Video visit? Yes  How would you like to obtain your AVS? MyChart  If you are dropped from the video visit, the video invite should be resent to: Text to cell phone: [phone]  Will anyone else be joining your video visit? No  {If patient encounters technical issues they should call [phone]      Video-Visit Details    Type of service:  Video Visit    Video Start Time: 1:05 pm  Video End Time: 1:23 pm    Originating Location (pt. Location): Home    Distant Location (provider location): Offsite    Platform used for Video Visit: [email or web address]    During this virtual visit the patient is located in [place], patient verifies this as the location during the entirety of this visit.     Weight Management Nutrition Consultation    Keshia Keating is a 22 year old female presents today for weight management nutrition consultation.      Patient referred by Jessica Infatne on December 7, 2022.    Patient with Co-morbidities of obesity including:  Type II DM no  Renal Failure no  Sleep apnea no  Hypertension " "no   Dyslipidemia no  Joint pain yes hip per pt  Back pain yes  GERD no     PMH:    Irregular menses   Asthma     Depression  Hx of suicide attempt July 2022 per NP    Just recently started meeting with her therapist for first time in a couple months    Anthropometrics:  Weight 12/7/22: 205 lbs with BMI 38.73    Estimated body mass index is 38.96 kg/m  as calculated from the following:    Height as of 2/24/23: 1.549 m (5' 1\").    Weight as of 2/24/23: 93.5 kg (206 lb 3.2 oz).     Current weight: 206 lbs 2/24/23    Medications for Weight Loss:  Metformin prescribed 12/7/22 - Stopped, was having increased gas and upset stomach. Lots of burping per pt    Wegovy prescribed 2/24/23 - still waiting on     Supplements:  Collagen  Vit E  - Tries to take but forgets often    NUTRITION HISTORY    Lactose intolerance - avoids milk aside from skim    Has never worked with a RD before. Did have nutrition education previously for constipation.    Typically eats 2 meals/day and snacks.  Feels snacking is more so because of boredom.    Tries to follow plate method but doesn't always go well per pt.     Loves water and could drink all the time but forgets.   Trying to decrease juice and pop intake.   Likes to make her own juice - has a juicer but hasn't been using.    Barriers: mental health (more likely to eat if depressed), thoughts around food (use to want to vomit anytime she ate), negative self talk, eats diff than her boyfriend and she gets tempted by foods he eats    Recently broke up with boyfriend but now they are back together    Hx of purging after meals. Stopped after having kids    March 2023:    Throat issues continue. Having a consult with ENT March 23rd. Also has sleep med appt coming up - has noticed increased issues with breathing due to her tonsils touching per pt. Likely going to have them removed     No motivation to cook - throat issues and also has been sick (cough/fever/chills)     Plans to look into protein " shakes but has not yet.     Staying well hydrated per pt - lots of water, some juice.    Previous goals:  1) Moderation with pop/juice. Aim to replace with whole fruit -  Going okay per pt. Not much motivation right now due to being sick per pt.   2) Change up type of fats when cooking - try olive oil instead    Additional information:   Recent sore throw has impacted intake. Harder to eat/drink. Recommended patient follow up with doctor sooner than later.    3 kids under age 5     Nutrition Prescription  Recommended energy/nutrient modification.    Nutrition Diagnosis  Obesity r/t long history of positive energy balance aeb BMI >30.    Nutrition Intervention  Reviewed and modified prevoius goals  Answered pt questions  Coordination of care   Nutrition education   AVS and handouts via [employer]     Patient demonstrates understanding.    Expected Engagement: good    Nutrition Goals  1. Consider flavored water instead of juice (crystal light, fruit, vegetables - cucumber water, demarcus/limes)     Fast, softer protein options:  - Greek yogurt  - Cottage cheese  - Deli meat  - Turkey/beef stick (chew well)  - Hard boiled eggs   - Tuna   - Quinoa   - Beans  - Lentils    Pureed Foods  [email or web address]     Pureed Recipes  [email or web address]://[email or web address]    Soft Foods  [email or web address]    Additional resources:    The Plate Method  [email or web address]    Protein Sources   [email or web address]://[email or web address]     Carbohydrates  [email or web address]     Mindful Eating  [email or web address]://[email or web address]     Summary of Volumetrics Eating Plan  [email or web address]     Follow-Up:  Thursday, April 6th at 11:30 am    Time spent with patient: 18 minutes.  WILLA Triplett, RD, LD

## 2023-03-08 NOTE — TELEPHONE ENCOUNTER
Called [facility] provider services at [phone]. Reference#:[id number]. Appeal was started on 3/3/23. Representative stated it usually takes up to 10 calendar days for a decision to be made.    Thank you,     Anselmo Klein [facility]  [facility]  [email or web address]   Phone: [phone]  Fax: [phone]

## 2023-03-14 NOTE — TELEPHONE ENCOUNTER
MEDICATION APPEAL APPROVED    Medication: Semaglutide-Weight Management (WEGOVY) 0.25 MG/0.5ML pen--PA Appeal Approved  Authorization Effective Date: 2/7/2023  Authorization Expiration Date: 9/7/2023  Approved Dose/Quantity: 2 ml per 28 days   Reference #:   Callref#:[id number]  Insurance Company: [employer] - Phone [phone] Fax [phone]  Expected CoPay:       CoPay Card Available:      Foundation Assistance Needed:    Which Pharmacy is filling the prescription (Not needed for infusion/clinic administered): [id number] [address]    Will upload approval paperwork once received. Was given this information verbally via phone when I called to check status.     Thank you,     Anselmo Klein [facility]  Pharmacy Clinic [facility] [email or web address]   Phone: [phone]  Fax: [phone]

## 2023-04-06 ENCOUNTER — VIRTUAL VISIT (OUTPATIENT)
Dept: ENDOCRINOLOGY | Facility: CLINIC | Age: 23
End: 2023-04-06
Payer: COMMERCIAL

## 2023-04-06 DIAGNOSIS — E66.01 CLASS 2 SEVERE OBESITY WITH SERIOUS COMORBIDITY AND BODY MASS INDEX (BMI) OF 38.0 TO 38.9 IN ADULT, UNSPECIFIED OBESITY TYPE (H): ICD-10-CM

## 2023-04-06 DIAGNOSIS — Z71.3 NUTRITIONAL COUNSELING: Primary | ICD-10-CM

## 2023-04-06 DIAGNOSIS — E66.812 CLASS 2 SEVERE OBESITY WITH SERIOUS COMORBIDITY AND BODY MASS INDEX (BMI) OF 38.0 TO 38.9 IN ADULT, UNSPECIFIED OBESITY TYPE (H): ICD-10-CM

## 2023-04-06 PROCEDURE — 99207 PR NO CHARGE LOS: CPT | Mod: VID | Performed by: DIETITIAN, REGISTERED

## 2023-04-06 PROCEDURE — 97803 MED NUTRITION INDIV SUBSEQ: CPT | Mod: VID | Performed by: DIETITIAN, REGISTERED

## 2023-04-06 NOTE — PATIENT INSTRUCTIONS
Nutrition Goals  1. Recommend aiming for 20-40 grams of protein in your protein shakes and per meal  2. Focus on hydration first, protein second after tonsil surgery (example protein ideas listed below)  3. Look at resources below, reach out with any questions.  4. Recommend watching videos online and discussing further with Jessica Infante if interested in considering surgical route.   Weight loss Seminar (scroll to bottom to find videos)  [email or web address]     Protein shake ideas:   - Premier Protein (160 Calories, 30 g protein)  - Muscle Milk, lactose-free, 17 oz bottle (210 Calories, 30 g protein)  - Boost/Ensure Max (160 calories, 30 gm protein)Â   - Fairlife Core Power (170 calories, 26 gm protein)  - Aldi's Elevation Protein Shake (160 calories, 30 gm protein)  - Equate (160 calories, 30 gm protein)    Clear Protein Drinks:  - BiPro  - Premier Protein clear  - Pummtkx3I (protein water)  - Broth  - Bone broth  - Beneprotein protein powder mixed with 4-6 oz of fluid    Non-meat protein Ideas  Quinoa  Eggs  Dairy (Cottage cheese, low fat cheese, greek yogurt)   Nuts  Beans  Lentils  Protein pasta   Nutritional yeast  Garden of life raw meal powder  Liquid aminos  Homemade meats - a taco meat could be made with chopped cauliflower/mushroom as an example   Hummus (could do homemade if preferred)  Hemp hearts   Tofu  Seitan     Fast, softer protein options:  - Greek yogurt  - Cottage cheese  - Deli meat  - Turkey/beef stick (chew well)  - Hard boiled eggs   - Tuna   - Quinoa   - Beans  - Lentils    Pureed Foods  [email or web address]     Pureed Recipes  [email or web address]    Soft Foods  [email or web address]    Additional resources:    The Plate Method  [email or web address]    Protein Sources   [email or web address]     Carbohydrates  [email or web address]     Mindful Eating  [email or web address]     Summary of Volumetrics Eating  Plan  [email or web address]://[email or web address]     Follow-Up:  Thursday, May 4th at 11:00 am    WILLA Cantor, RD, LD  Clinic #: [phone]

## 2023-04-06 NOTE — PROGRESS NOTES
"Keshia Keating is a 23 year old female who is being evaluated via a billable video visit.      The patient has been notified of following:     \"This video visit will be conducted via a call between you and your physician/provider. We have found that certain health care needs can be provided without the need for an in-person physical exam.  This service lets us provide the care you need with a video conversation.  If a prescription is necessary we can send it directly to your pharmacy.  If lab work is needed we can place an order for that and you can then stop by our lab to have the test done at a later time.    Video visits are billed at different rates depending on your insurance coverage.  Please reach out to your insurance provider with any questions.    If during the course of the call the physician/provider feels a video visit is not appropriate, you will not be charged for this service.\"    Patient has given verbal consent for Video visit? Yes  How would you like to obtain your AVS? MyChart  If you are dropped from the video visit, the video invite should be resent to: Text to cell phone: [phone]  Will anyone else be joining your video visit? No  {If patient encounters technical issues they should call [phone]      Video-Visit Details    Type of service:  Video Visit    Video Start Time: 11:37 am  Video End Time: 11:59 am    Originating Location (pt. Location): Home    Distant Location (provider location): Offsite    Platform used for Video Visit: [employer]    During this virtual visit the patient is located in [place], patient verifies this as the location during the entirety of this visit.     Weight Management Nutrition Consultation    Keshia Keating is a 23 year old female presents today for weight management nutrition consultation.      Patient referred by Jessica Infante on December 7, 2022.    Patient with Co-morbidities of obesity including:  Type II DM no  Renal Failure no  Sleep apnea " "no  Hypertension no   Dyslipidemia no  Joint pain yes hip per pt  Back pain yes  GERD no     PMH:    Irregular menses   Asthma     Depression  Hx of suicide attempt July 2022 per NP    Just recently started meeting with her therapist for first time in a couple months    Anthropometrics:  Weight 12/7/22: 205 lbs with BMI 38.73    Estimated body mass index is 38.96 kg/m  as calculated from the following:    Height as of 2/24/23: 1.549 m (5' 1\").    Weight as of 2/24/23: 93.5 kg (206 lb 3.2 oz).     Current weight: 210 lbs, pt report    Medications for Weight Loss:  Metformin prescribed 12/7/22 - Stopped, was having increased gas and upset stomach. Lots of burping per pt    Wegovy prescribed 2/24/23 - still waiting on per pt. Will call Pharmacy today.     Supplements:  Collagen  Vit E  - Not taking right now.    NUTRITION HISTORY    Lactose intolerance - avoids milk aside from skim    Has never worked with a RD before. Did have nutrition education previously for constipation.    Typically eats 2 meals/day and snacks.  Feels snacking is more so because of boredom.    Tries to follow plate method but doesn't always go well per pt.     Loves water and could drink all the time but forgets.   Trying to decrease juice and pop intake.   Likes to make her own juice - has a juicer but hasn't been using.    Barriers: mental health (more likely to eat if depressed), thoughts around food (use to want to vomit anytime she ate), negative self talk, eats diff than her boyfriend and she gets tempted by foods he eats    Recently broke up with boyfriend but now they are back together    Hx of purging after meals. Stopped after having kids    March 2023:    Throat issues continue. Having a consult with ENT March 23rd. Also has sleep med appt coming up - has noticed increased issues with breathing due to her tonsils touching per pt. Likely going to have them removed     No motivation to cook - throat issues and also has been sick " (cough/fever/chills)     Plans to look into protein shakes but has not yet.     Staying well hydrated per pt - lots of water, some juice.    April 2023:  Patient had questions regarding her Wegovy - still has not received. Recommended pt check in with Pharmacy.    Patient having her tonsils removed tomorrow. Focused on ways to meet nutrition goals post surgery.     Feeling turned off by chicken and pork lately. Trying to do turkey over ham in sandwiches right now. Considering going Vegetarian - discussed. Pt reports hearing a story about meat that makes her not want to eat it anymore sometimes.     Hydration: improving, getting 4-5 water bottles/day currently. Cut back on juice.    Previous goals:  1. Consider flavored water instead of juice (crystal light, fruit, vegetables - cucumber water, demarcus/limes)  - Doing plain water more often currently. Felt addicted to sugar previously. Doing about 4-5 water bottles/day. Not doing juice most days aside from sips from her kids juice.    Additional information:   Recent sore throw has impacted intake. Harder to eat/drink. Recommended patient follow up with doctor sooner than later.    3 kids under age 5     Nutrition Prescription  Recommended energy/nutrient modification.    Nutrition Diagnosis  Obesity r/t long history of positive energy balance aeb BMI >30.    Nutrition Intervention  Reviewed and modified prevoius goals  Answered pt questions  Coordination of care   Nutrition education   AVS and handouts via [employer]     Patient demonstrates understanding.    Expected Engagement: good    Nutrition Goals  1. Recommend aiming for 20-40 grams of protein in your protein shakes and per meal  2. Focus on hydration first, protein second after tonsil surgery (example protein ideas listed below)  3. Look at resources below, reach out with any questions.  4. Recommend watching videos online and discussing further with Jessica Infante if interested in considering surgical route.    Weight loss Seminar (scroll to bottom to find videos)  [email or web address]     Protein shake ideas:   - Premier Protein (160 Calories, 30 g protein)  - Muscle Milk, lactose-free, 17 oz bottle (210 Calories, 30 g protein)  - Boost/Ensure Max (160 calories, 30 gm protein)Â   - [employer] Core Power (170 calories, 26 gm protein)  - Aldi's Elevation Protein Shake (160 calories, 30 gm protein)  - Equate (160 calories, 30 gm protein)    Clear Protein Drinks:  - BiPro  - Premier Protein clear  - Nqtezwu1T (protein water)  - Broth  - Bone broth  - Beneprotein protein powder mixed with 4-6 oz of fluid    Non-meat protein Ideas  ? Quinoa  ? Eggs  ? Dairy (Cottage cheese, low fat cheese, greek yogurt)   ? Nuts  ? Beans  ? Lentils  ? Protein pasta   ? Nutritional yeast  ? Garden of life raw meal powder  ? Liquid aminos  ? Homemade meats - a taco meat could be made with chopped cauliflower/mushroom as an example   ? Hummus (could do homemade if preferred)  ? Hemp hearts   ? Tofu  ? Seitan     Fast, softer protein options:  - Greek yogurt  - Cottage cheese  - Deli meat  - Turkey/beef stick (chew well)  - Hard boiled eggs   - Tuna   - Quinoa   - Beans  - Lentils    Pureed Foods  [email or web address]     Pureed Recipes  http://[employer]/[email or web address]    Soft Foods  [email or web address]    Additional resources:    The Plate Method  [email or web address]://[email or web address]    Protein Sources   http://[employer]/[email or web address]     Carbohydrates  [email or web address]://[email or web address]     Mindful Eating  http://[employer]/728888.pdf     Summary of Volumetrics Eating Plan  [email or web address]     Follow-Up:  Thursday, May 4th at 11:00 am    Time spent with patient: 22 minutes.  WILLA Triplett, RD, LD

## 2023-04-06 NOTE — LETTER
"4/6/2023       RE: Keshia Keating  [address] [place] [address]     Dear Colleague,    Thank you for referring your patient, Keshia Keating, to the [facility] [place] [facility]. Please see a copy of my visit note below.    Keshia Keating is a 23 year old female who is being evaluated via a billable video visit.      The patient has been notified of following:     \"This video visit will be conducted via a call between you and your physician/provider. We have found that certain health care needs can be provided without the need for an in-person physical exam.  This service lets us provide the care you need with a video conversation.  If a prescription is necessary we can send it directly to your pharmacy.  If lab work is needed we can place an order for that and you can then stop by our lab to have the test done at a later time.    Video visits are billed at different rates depending on your insurance coverage.  Please reach out to your insurance provider with any questions.    If during the course of the call the physician/provider feels a video visit is not appropriate, you will not be charged for this service.\"    Patient has given verbal consent for Video visit? Yes  How would you like to obtain your AVS? MyChart  If you are dropped from the video visit, the video invite should be resent to: Text to cell phone: [phone]  Will anyone else be joining your video visit? No  {If patient encounters technical issues they should call [phone]      Video-Visit Details    Type of service:  Video Visit    Video Start Time: 11:37 am  Video End Time: 11:59 am    Originating Location (pt. Location): Home    Distant Location (provider location): Offsite    Platform used for Video Visit: [employer]    During this virtual visit the patient is located in [place], patient verifies this as the location during the entirety of this " "visit.     Weight Management Nutrition Consultation    Keshia Keating is a 23 year old female presents today for weight management nutrition consultation.      Patient referred by Jessica Infante on December 7, 2022.    Patient with Co-morbidities of obesity including:  Type II DM no  Renal Failure no  Sleep apnea no  Hypertension no   Dyslipidemia no  Joint pain yes hip per pt  Back pain yes  GERD no     PMH:    Irregular menses   Asthma     Depression  Hx of suicide attempt July 2022 per NP    Just recently started meeting with her therapist for first time in a couple months    Anthropometrics:  Weight 12/7/22: 205 lbs with BMI 38.73    Estimated body mass index is 38.96 kg/m  as calculated from the following:    Height as of 2/24/23: 1.549 m (5' 1\").    Weight as of 2/24/23: 93.5 kg (206 lb 3.2 oz).     Current weight: 210 lbs, pt report    Medications for Weight Loss:  Metformin prescribed 12/7/22 - Stopped, was having increased gas and upset stomach. Lots of burping per pt    Wegovy prescribed 2/24/23 - still waiting on per pt. Will call Pharmacy today.     Supplements:  Collagen  Vit E  - Not taking right now.    NUTRITION HISTORY    Lactose intolerance - avoids milk aside from skim    Has never worked with a RD before. Did have nutrition education previously for constipation.    Typically eats 2 meals/day and snacks.  Feels snacking is more so because of boredom.    Tries to follow plate method but doesn't always go well per pt.     Loves water and could drink all the time but forgets.   Trying to decrease juice and pop intake.   Likes to make her own juice - has a juicer but hasn't been using.    Barriers: mental health (more likely to eat if depressed), thoughts around food (use to want to vomit anytime she ate), negative self talk, eats diff than her boyfriend and she gets tempted by foods he eats    Recently broke up with boyfriend but now they are back together    Hx of purging after meals. Stopped after " having kids    March 2023:    Throat issues continue. Having a consult with ENT March 23rd. Also has sleep med appt coming up - has noticed increased issues with breathing due to her tonsils touching per pt. Likely going to have them removed     No motivation to cook - throat issues and also has been sick (cough/fever/chills)     Plans to look into protein shakes but has not yet.     Staying well hydrated per pt - lots of water, some juice.    April 2023:  Patient had questions regarding her Wegovy - still has not received. Recommended pt check in with Pharmacy.    Patient having her tonsils removed tomorrow. Focused on ways to meet nutrition goals post surgery.     Feeling turned off by chicken and pork lately. Trying to do turkey over ham in sandwiches right now. Considering going Vegetarian - discussed. Pt reports hearing a story about meat that makes her not want to eat it anymore sometimes.     Hydration: improving, getting 4-5 water bottles/day currently. Cut back on juice.    Previous goals:  1. Consider flavored water instead of juice (crystal light, fruit, vegetables - cucumber water, demarcus/limes)  - Doing plain water more often currently. Felt addicted to sugar previously. Doing about 4-5 water bottles/day. Not doing juice most days aside from sips from her kids juice.    Additional information:   Recent sore throw has impacted intake. Harder to eat/drink. Recommended patient follow up with doctor sooner than later.    3 kids under age 5     Nutrition Prescription  Recommended energy/nutrient modification.    Nutrition Diagnosis  Obesity r/t long history of positive energy balance aeb BMI >30.    Nutrition Intervention  Reviewed and modified prevoius goals  Answered pt questions  Coordination of care   Nutrition education   AVS and handouts via [employer]     Patient demonstrates understanding.    Expected Engagement: good    Nutrition Goals  1. Recommend aiming for 20-40 grams of protein in your protein  shakes and per meal  2. Focus on hydration first, protein second after tonsil surgery (example protein ideas listed below)  3. Look at resources below, reach out with any questions.  4. Recommend watching videos online and discussing further with Jessica Infante if interested in considering surgical route.   Weight loss Seminar (scroll to bottom to find videos)  [email or web address]     Protein shake ideas:   - Premier Protein (160 Calories, 30 g protein)  - Muscle Milk, lactose-free, 17 oz bottle (210 Calories, 30 g protein)  - Boost/Ensure Max (160 calories, 30 gm protein)Â   - Fairlife Core Power (170 calories, 26 gm protein)  - [employer] Elevation Protein Shake (160 calories, 30 gm protein)  - Equate (160 calories, 30 gm protein)    Clear Protein Drinks:  - BiPro  - Premier Protein clear  - Oeqttrw6M (protein water)  - Broth  - Bone broth  - Beneprotein protein powder mixed with 4-6 oz of fluid    Non-meat protein Ideas  ? Quinoa  ? Eggs  ? Dairy (Cottage cheese, low fat cheese, greek yogurt)   ? Nuts  ? Beans  ? Lentils  ? Protein pasta   ? Nutritional yeast  ? Garden of life raw meal powder  ? Liquid aminos  ? Homemade meats - a taco meat could be made with chopped cauliflower/mushroom as an example   ? Hummus (could do homemade if preferred)  ? Hemp hearts   ? Tofu  ? Seitan     Fast, softer protein options:  - Greek yogurt  - Cottage cheese  - Deli meat  - Turkey/beef stick (chew well)  - Hard boiled eggs   - Tuna   - Quinoa   - Beans  - Lentils    Pureed Foods  [email or web address]     Pureed Recipes  [email or web address]    Soft Foods  [email or web address]    Additional resources:    The Plate Method  [email or web address]    Protein Sources   [email or web address]     Carbohydrates  [email or web address]     Mindful Eating  [email or web address]     Summary of Volumetrics Eating Plan  [email or web address]     Follow-Up:  Thursday, May  4th at 11:00 am    Time spent with patient: 22 minutes.  WILLA Triplett, RD, LD        Again, thank you for allowing me to participate in the care of your patient.      Sincerely,    Abigail White RD

## 2024-03-09 ENCOUNTER — HEALTH MAINTENANCE LETTER (OUTPATIENT)
Age: 24
End: 2024-03-09

## 2025-03-16 ENCOUNTER — HEALTH MAINTENANCE LETTER (OUTPATIENT)
Age: 25
End: 2025-03-16